# Patient Record
Sex: FEMALE | Race: WHITE | NOT HISPANIC OR LATINO | URBAN - METROPOLITAN AREA
[De-identification: names, ages, dates, MRNs, and addresses within clinical notes are randomized per-mention and may not be internally consistent; named-entity substitution may affect disease eponyms.]

---

## 2019-01-30 NOTE — PATIENT DISCUSSION
Pt on and off Plaquenil since 2002. On 200 mg a day, low risk. No evidence of toxicity. Will send for a HVF 10-2 red.

## 2022-02-07 ENCOUNTER — IMPORTED ENCOUNTER (OUTPATIENT)
Dept: URBAN - METROPOLITAN AREA CLINIC 31 | Facility: CLINIC | Age: 62
End: 2022-02-07

## 2022-02-07 PROBLEM — H25.812: Noted: 2022-02-07

## 2022-02-07 PROBLEM — H18.513: Noted: 2022-02-07

## 2022-02-07 PROCEDURE — 92015 DETERMINE REFRACTIVE STATE: CPT

## 2022-02-07 PROCEDURE — 99204 OFFICE O/P NEW MOD 45 MIN: CPT

## 2022-02-07 PROCEDURE — 92286 ANT SGM IMG I&R SPECLR MIC: CPT

## 2022-02-07 NOTE — PATIENT DISCUSSION
1. Combined Types of Cataract OS: Discussed the risks benefits alternatives and limitations of cataract surgery including infection bleeding loss of vision retinal tears detachment. Refractive options were reviewed. The patient stated a full understanding and a desire to proceed with the procedure in the left eye. Patient has elected to be optimized for reading  vision in the left eye. The patient will still need glasses for reading and to possibly fine tune distance vision. pore -2. .50 pt does mono now uses OS for reading and computer at work. Schedule KPE/IOL OS- READING- Pt on blood thinner will get medical clearance from Dr Sobeida Trinidad h/o recent stent. Pt can stay on blood thinner. Viscoat. po decadron pred q2wa for a week then 4x/d with taper a drop a week2. Fuchs Dystrophy OU: ECC normal few guttata. Recommend clinical observation. 3.  Return for an appointment for Admit. with Dr. Idania Neri. 4.  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

## 2022-04-02 ASSESSMENT — VISUAL ACUITY
OS_PH: CC 20/50
OD_CC: 20/80
OS_CC: 20/200-1
OD_SC: 20/60-2
OD_PH: CC 20/40
OS_SC: 20/20
OD_SC: 20/400
OS_SC: 20/50
OD_CC: 20/400

## 2022-04-02 ASSESSMENT — TONOMETRY
OS_IOP_MMHG: 18
OD_IOP_MMHG: 19

## 2022-05-13 ENCOUNTER — CONSULTATION/EVALUATION (OUTPATIENT)
Dept: URBAN - METROPOLITAN AREA CLINIC 29 | Facility: CLINIC | Age: 62
End: 2022-05-13

## 2022-05-13 DIAGNOSIS — H25.812: ICD-10-CM

## 2022-05-13 DIAGNOSIS — H43.819: ICD-10-CM

## 2022-05-13 DIAGNOSIS — H20.022: ICD-10-CM

## 2022-05-13 DIAGNOSIS — D31.31: ICD-10-CM

## 2022-05-13 PROCEDURE — 99214 OFFICE O/P EST MOD 30 MIN: CPT

## 2022-05-13 PROCEDURE — 76519 ECHO EXAM OF EYE: CPT

## 2022-05-13 PROCEDURE — 92136 OPHTHALMIC BIOMETRY: CPT

## 2022-05-13 PROCEDURE — 92025 CPTRIZED CORNEAL TOPOGRAPHY: CPT

## 2022-05-13 ASSESSMENT — VISUAL ACUITY
OS_SC: 20/400
OD_CC: 20/40
OD_SC: 20/80-2
OS_CC: J7-2
OD_CC: J3
OS_SC: J1+
OS_CC: 20/50
OD_SC: J2-1

## 2022-05-13 ASSESSMENT — TONOMETRY
OD_IOP_MMHG: 13
OS_IOP_MMHG: 12

## 2022-05-13 NOTE — PATIENT DISCUSSION
The patient feels that the cataract is significantly impacting daily activities and has elected cataract surgery. The risks, benefits, and alternatives to surgery were discussed. The patient elects to proceed with surgery. Viscoat, po decadron, PRED q2WA for 1st week then 4x/d with normal taper.

## 2022-06-06 ENCOUNTER — SURGERY/PROCEDURE (OUTPATIENT)
Dept: URBAN - METROPOLITAN AREA SURGERY 17 | Facility: SURGERY | Age: 62
End: 2022-06-06

## 2022-06-06 DIAGNOSIS — H25.812: ICD-10-CM

## 2022-06-06 PROCEDURE — 66984 XCAPSL CTRC RMVL W/O ECP: CPT

## 2022-06-07 ENCOUNTER — POST-OP (OUTPATIENT)
Dept: URBAN - METROPOLITAN AREA CLINIC 29 | Facility: CLINIC | Age: 62
End: 2022-06-07

## 2022-06-07 DIAGNOSIS — Z96.1: ICD-10-CM

## 2022-06-07 ASSESSMENT — VISUAL ACUITY
OS_PH: 20/40
OD_PH: 20/30
OD_CC: 20/40+2
OS_SC: 20/200
OS_SC: 20/30

## 2022-06-07 ASSESSMENT — TONOMETRY
OD_IOP_MMHG: 14
OS_IOP_MMHG: 20

## 2022-06-13 ENCOUNTER — POST-OP (OUTPATIENT)
Dept: URBAN - METROPOLITAN AREA CLINIC 29 | Facility: CLINIC | Age: 62
End: 2022-06-13

## 2022-06-13 DIAGNOSIS — Z96.1: ICD-10-CM

## 2022-06-13 DIAGNOSIS — H20.12: ICD-10-CM

## 2022-06-13 ASSESSMENT — VISUAL ACUITY
OS_SC: 20/200-1
OD_CC: 20/40+2
OS_PH: 20/30
OS_SC: 20/20

## 2022-06-13 ASSESSMENT — TONOMETRY: OS_IOP_MMHG: 15

## 2022-06-13 NOTE — PATIENT DISCUSSION
Post op instructions reviewed with patients including the use of drops. Pore reading. Pt requests distance only glasses.